# Patient Record
Sex: FEMALE | Race: WHITE
[De-identification: names, ages, dates, MRNs, and addresses within clinical notes are randomized per-mention and may not be internally consistent; named-entity substitution may affect disease eponyms.]

---

## 2019-10-02 ENCOUNTER — HOSPITAL ENCOUNTER (OUTPATIENT)
Dept: HOSPITAL 33 - SDC-PAIN | Age: 69
Discharge: HOME | End: 2019-10-02
Attending: PSYCHIATRY & NEUROLOGY
Payer: MEDICARE

## 2019-10-02 DIAGNOSIS — M79.7: ICD-10-CM

## 2019-10-02 DIAGNOSIS — M16.12: Primary | ICD-10-CM

## 2019-10-02 DIAGNOSIS — Z79.899: ICD-10-CM

## 2019-10-02 DIAGNOSIS — F32.9: ICD-10-CM

## 2019-10-02 DIAGNOSIS — M70.62: ICD-10-CM

## 2019-10-02 DIAGNOSIS — J44.9: ICD-10-CM

## 2019-10-02 DIAGNOSIS — I25.10: ICD-10-CM

## 2019-10-02 PROCEDURE — 77002 NEEDLE LOCALIZATION BY XRAY: CPT

## 2019-10-02 PROCEDURE — 72020 X-RAY EXAM OF SPINE 1 VIEW: CPT

## 2019-10-02 PROCEDURE — 20552 NJX 1/MLT TRIGGER POINT 1/2: CPT

## 2019-10-02 NOTE — XRAY
Indication: Left piriformis muscle injection.



Intraoperative fluoroscopy was provided for 13 seconds.  Single digital spot

image obtained prone demonstrates needle tip projecting over the expected left

piriformis muscle.  Small amount of contrast injected for needle tip

placement.  Correlate with intraoperative findings/report.

## 2019-10-23 ENCOUNTER — HOSPITAL ENCOUNTER (OUTPATIENT)
Dept: HOSPITAL 33 - SDC-PAIN | Age: 69
Discharge: HOME | End: 2019-10-23
Attending: PSYCHIATRY & NEUROLOGY
Payer: MEDICARE

## 2019-10-23 DIAGNOSIS — Z79.899: ICD-10-CM

## 2019-10-23 DIAGNOSIS — J44.9: ICD-10-CM

## 2019-10-23 DIAGNOSIS — M79.7: ICD-10-CM

## 2019-10-23 DIAGNOSIS — F32.9: ICD-10-CM

## 2019-10-23 DIAGNOSIS — M47.816: Primary | ICD-10-CM

## 2019-10-23 PROCEDURE — 72020 X-RAY EXAM OF SPINE 1 VIEW: CPT

## 2019-10-23 PROCEDURE — 64494 INJ PARAVERT F JNT L/S 2 LEV: CPT

## 2019-10-23 PROCEDURE — 64493 INJ PARAVERT F JNT L/S 1 LEV: CPT

## 2019-10-23 PROCEDURE — 77002 NEEDLE LOCALIZATION BY XRAY: CPT

## 2019-10-23 NOTE — XRAY
Indication: Bilateral L4-S1 MBB.



Intraoperative fluoroscopy was provided for 7 seconds.  Single digital spot

image submitted for interpretation demonstrates posterior needle tips

projecting over the expected course of the left and right L4-S1 nerve roots.

Correlate with intraoperative findings/report.

## 2019-12-18 ENCOUNTER — HOSPITAL ENCOUNTER (OUTPATIENT)
Dept: HOSPITAL 33 - SDC-PAIN | Age: 69
Discharge: HOME | End: 2019-12-18
Attending: PSYCHIATRY & NEUROLOGY
Payer: MEDICARE

## 2019-12-18 DIAGNOSIS — M79.7: ICD-10-CM

## 2019-12-18 DIAGNOSIS — Z79.899: ICD-10-CM

## 2019-12-18 DIAGNOSIS — I25.10: ICD-10-CM

## 2019-12-18 DIAGNOSIS — M47.816: Primary | ICD-10-CM

## 2019-12-18 DIAGNOSIS — F41.8: ICD-10-CM

## 2019-12-18 DIAGNOSIS — J44.9: ICD-10-CM

## 2019-12-18 PROCEDURE — 64493 INJ PARAVERT F JNT L/S 1 LEV: CPT

## 2019-12-18 PROCEDURE — 72020 X-RAY EXAM OF SPINE 1 VIEW: CPT

## 2019-12-18 PROCEDURE — 64494 INJ PARAVERT F JNT L/S 2 LEV: CPT

## 2019-12-18 PROCEDURE — 77002 NEEDLE LOCALIZATION BY XRAY: CPT

## 2019-12-18 NOTE — XRAY
Indication: Bilateral L4-S1 MBB.



Intraoperative fluoroscopy was provided for 8 seconds.  Single digital spot

image submitted for interpretation demonstrates posterior needle tips

projecting over the expected course of the left and right L4-S1 nerve roots.

Correlate with intraoperative findings/report.

## 2020-02-19 ENCOUNTER — HOSPITAL ENCOUNTER (OUTPATIENT)
Dept: HOSPITAL 33 - SDC-PAIN | Age: 70
Discharge: HOME | End: 2020-02-19
Attending: PSYCHIATRY & NEUROLOGY
Payer: MEDICARE

## 2020-02-19 DIAGNOSIS — J44.9: ICD-10-CM

## 2020-02-19 DIAGNOSIS — Z79.899: ICD-10-CM

## 2020-02-19 DIAGNOSIS — F41.8: ICD-10-CM

## 2020-02-19 DIAGNOSIS — M47.819: ICD-10-CM

## 2020-02-19 DIAGNOSIS — M79.7: ICD-10-CM

## 2020-02-19 DIAGNOSIS — M47.816: Primary | ICD-10-CM

## 2020-02-19 DIAGNOSIS — I25.10: ICD-10-CM

## 2020-02-19 PROCEDURE — 64635 DESTROY LUMB/SAC FACET JNT: CPT

## 2020-02-19 PROCEDURE — 72100 X-RAY EXAM L-S SPINE 2/3 VWS: CPT

## 2020-02-19 PROCEDURE — 77002 NEEDLE LOCALIZATION BY XRAY: CPT

## 2020-02-19 PROCEDURE — 64636 DESTROY L/S FACET JNT ADDL: CPT

## 2020-02-19 NOTE — XRAY
Indication: Left L4-S1 RFA.



Intraoperative fluoroscopy was provided for 29 seconds.  3 digital spot images

submitted for interpretation demonstrates posterior needle tips projecting

over the expected course of the left L4-S1 nerve root.  Correlate with

intraoperative findings/report.

## 2020-03-04 ENCOUNTER — HOSPITAL ENCOUNTER (OUTPATIENT)
Dept: HOSPITAL 33 - SDC-PAIN | Age: 70
Discharge: HOME | End: 2020-03-04
Attending: PSYCHIATRY & NEUROLOGY
Payer: MEDICARE

## 2020-03-04 DIAGNOSIS — Z79.899: ICD-10-CM

## 2020-03-04 DIAGNOSIS — I25.10: ICD-10-CM

## 2020-03-04 DIAGNOSIS — M79.7: ICD-10-CM

## 2020-03-04 DIAGNOSIS — M47.816: Primary | ICD-10-CM

## 2020-03-04 DIAGNOSIS — J44.9: ICD-10-CM

## 2020-03-04 PROCEDURE — 77002 NEEDLE LOCALIZATION BY XRAY: CPT

## 2020-03-04 PROCEDURE — 64636 DESTROY L/S FACET JNT ADDL: CPT

## 2020-03-04 PROCEDURE — 64635 DESTROY LUMB/SAC FACET JNT: CPT

## 2020-03-04 PROCEDURE — 72100 X-RAY EXAM L-S SPINE 2/3 VWS: CPT

## 2020-03-04 NOTE — XRAY
Indication: Right L5-S1 RFA.



Intraoperative fluoroscopy was provided for 26 seconds.  3 digital spot images

submitted for interpretation demonstrates posterior needle tips projecting

over the expected course of the right L4-S1 nerve roots.  Correlate with

intraoperative findings/report.

## 2020-07-01 ENCOUNTER — HOSPITAL ENCOUNTER (OUTPATIENT)
Dept: HOSPITAL 33 - SDC-PAIN | Age: 70
Discharge: HOME | End: 2020-07-01
Attending: PSYCHIATRY & NEUROLOGY
Payer: MEDICARE

## 2020-07-01 DIAGNOSIS — J44.9: ICD-10-CM

## 2020-07-01 DIAGNOSIS — M16.11: Primary | ICD-10-CM

## 2020-07-01 DIAGNOSIS — Z79.899: ICD-10-CM

## 2020-07-01 DIAGNOSIS — M79.7: ICD-10-CM

## 2020-07-01 DIAGNOSIS — F41.8: ICD-10-CM

## 2020-07-01 DIAGNOSIS — I25.10: ICD-10-CM

## 2020-07-01 PROCEDURE — 73501 X-RAY EXAM HIP UNI 1 VIEW: CPT

## 2020-07-01 PROCEDURE — 77002 NEEDLE LOCALIZATION BY XRAY: CPT

## 2020-07-01 PROCEDURE — 20610 DRAIN/INJ JOINT/BURSA W/O US: CPT

## 2020-07-01 NOTE — XRAY
Indication: Right hip injection.



Intraoperative fluoroscopy was provided for 15 seconds.  Single digital spot

image submitted for interpretation demonstrates needle tip just lateral to the

right femur neck.  Small amount of contrast injected for needle tip placement.

 Correlate with intraoperative findings/report.

## 2020-10-14 ENCOUNTER — HOSPITAL ENCOUNTER (OUTPATIENT)
Dept: HOSPITAL 33 - SDC-PAIN | Age: 70
Discharge: HOME | End: 2020-10-14
Attending: PSYCHIATRY & NEUROLOGY
Payer: MEDICARE

## 2020-10-14 DIAGNOSIS — M79.18: ICD-10-CM

## 2020-10-14 DIAGNOSIS — I25.10: ICD-10-CM

## 2020-10-14 DIAGNOSIS — J44.9: ICD-10-CM

## 2020-10-14 DIAGNOSIS — Z79.899: ICD-10-CM

## 2020-10-14 DIAGNOSIS — M46.1: Primary | ICD-10-CM

## 2020-10-14 PROCEDURE — 77002 NEEDLE LOCALIZATION BY XRAY: CPT

## 2020-10-14 PROCEDURE — 72202 X-RAY EXAM SI JOINTS 3/> VWS: CPT

## 2020-10-14 PROCEDURE — 20552 NJX 1/MLT TRIGGER POINT 1/2: CPT

## 2020-10-14 PROCEDURE — G0260 INJ FOR SACROILIAC JT ANESTH: HCPCS

## 2020-10-14 PROCEDURE — 27096 INJECT SACROILIAC JOINT: CPT

## 2020-10-14 PROCEDURE — 99100 ANES PT EXTEME AGE<1 YR&>70: CPT

## 2020-10-14 NOTE — XRAY
Indication: Right SI joint and piriformis muscle injection.



Intraoperative fluoroscopy was provided for 18 seconds.  3 digital spot images

obtained prone submitted for interpretation demonstrates posterior needle tip

projecting over the inferior right SI joint.  A second posterior needle tip

projects over the expected right piriformis muscle with small amount of

contrast injected for needle tip placement.  Correlate with intraoperative

findings/report.

## 2024-09-04 ENCOUNTER — HOSPITAL ENCOUNTER (OUTPATIENT)
Dept: HOSPITAL 33 - SDC-PAIN | Age: 74
Discharge: HOME | End: 2024-09-04
Attending: PSYCHIATRY & NEUROLOGY
Payer: MEDICARE

## 2024-09-04 DIAGNOSIS — M79.18: ICD-10-CM

## 2024-09-04 DIAGNOSIS — M54.16: Primary | ICD-10-CM

## 2024-09-04 PROCEDURE — 77003 FLUOROGUIDE FOR SPINE INJECT: CPT

## 2024-09-04 PROCEDURE — 72170 X-RAY EXAM OF PELVIS: CPT

## 2024-09-04 PROCEDURE — 64484 NJX AA&/STRD TFRM EPI L/S EA: CPT

## 2024-09-04 PROCEDURE — 77002 NEEDLE LOCALIZATION BY XRAY: CPT

## 2024-09-04 PROCEDURE — 72100 X-RAY EXAM L-S SPINE 2/3 VWS: CPT

## 2024-09-04 PROCEDURE — 99100 ANES PT EXTEME AGE<1 YR&>70: CPT

## 2024-09-04 PROCEDURE — 64483 NJX AA&/STRD TFRM EPI L/S 1: CPT

## 2024-09-04 PROCEDURE — 20553 NJX 1/MLT TRIGGER POINTS 3/>: CPT

## 2024-09-04 NOTE — XRAY
Indication: Left piriformis injection.



Intraoperative fluoroscopy provided for 14 seconds.  Single digital spot image

submitted for interpretation demonstrates posterior needle tip projecting over

left piriformis.  Small amount of contrast injected for needle tip placement.

Correlate with intraoperative findings/report.

## 2024-10-23 ENCOUNTER — HOSPITAL ENCOUNTER (OUTPATIENT)
Dept: HOSPITAL 33 - SDC-PAIN | Age: 74
Discharge: HOME | End: 2024-10-23
Attending: PSYCHIATRY & NEUROLOGY
Payer: MEDICARE

## 2024-10-23 DIAGNOSIS — M54.16: Primary | ICD-10-CM

## 2024-10-23 DIAGNOSIS — M79.18: ICD-10-CM

## 2024-10-23 PROCEDURE — 64483 NJX AA&/STRD TFRM EPI L/S 1: CPT

## 2024-10-23 PROCEDURE — 77003 FLUOROGUIDE FOR SPINE INJECT: CPT

## 2024-10-23 PROCEDURE — 20552 NJX 1/MLT TRIGGER POINT 1/2: CPT

## 2024-10-23 PROCEDURE — 72100 X-RAY EXAM L-S SPINE 2/3 VWS: CPT

## 2024-10-23 PROCEDURE — 77002 NEEDLE LOCALIZATION BY XRAY: CPT

## 2024-10-23 PROCEDURE — 72170 X-RAY EXAM OF PELVIS: CPT

## 2024-10-23 PROCEDURE — 64484 NJX AA&/STRD TFRM EPI L/S EA: CPT

## 2024-10-23 NOTE — XRAY
Indication: Right piriformis injection JOE.



Intraoperative fluoroscopy provided for 7 seconds.  Single digital spot image

submitted for interpretation demonstrates posterior needle tip projecting over

the right piriformis.  Small amount of contrast injected for needle tip

placement.  Correlate with intraoperative findings/report.  Incidental right

SI joint fusion hardware

## 2024-10-23 NOTE — XRAY
Indication: Right L4-S1 transforaminal JOE.



Intraoperative fluoroscopy provided for 28 seconds.  4 digital spot image

submitted for interpretation demonstrates posterior needle tips projecting

over the expected right L4 and L5 nerve roots.  Small amount of contrast

injected for needle tip placement.  Correlate with intraoperative

findings/report.  Incidental right SI joint fusion hardware

## 2025-04-30 ENCOUNTER — HOSPITAL ENCOUNTER (INPATIENT)
Dept: HOSPITAL 33 - ED | Age: 75
LOS: 5 days | Discharge: HOME | DRG: 871 | End: 2025-05-05
Attending: INTERNAL MEDICINE | Admitting: INTERNAL MEDICINE
Payer: MEDICARE

## 2025-04-30 DIAGNOSIS — Z79.899: ICD-10-CM

## 2025-04-30 DIAGNOSIS — E87.1: ICD-10-CM

## 2025-04-30 DIAGNOSIS — E78.5: ICD-10-CM

## 2025-04-30 DIAGNOSIS — F10.10: ICD-10-CM

## 2025-04-30 DIAGNOSIS — J96.01: ICD-10-CM

## 2025-04-30 DIAGNOSIS — N17.9: ICD-10-CM

## 2025-04-30 DIAGNOSIS — I95.9: ICD-10-CM

## 2025-04-30 DIAGNOSIS — Z72.0: ICD-10-CM

## 2025-04-30 DIAGNOSIS — J18.9: ICD-10-CM

## 2025-04-30 DIAGNOSIS — I10: ICD-10-CM

## 2025-04-30 DIAGNOSIS — D72.829: ICD-10-CM

## 2025-04-30 DIAGNOSIS — R74.01: ICD-10-CM

## 2025-04-30 DIAGNOSIS — J44.1: ICD-10-CM

## 2025-04-30 DIAGNOSIS — E66.9: ICD-10-CM

## 2025-04-30 DIAGNOSIS — Z79.01: ICD-10-CM

## 2025-04-30 DIAGNOSIS — R60.0: ICD-10-CM

## 2025-04-30 DIAGNOSIS — Z86.718: ICD-10-CM

## 2025-04-30 DIAGNOSIS — I25.10: ICD-10-CM

## 2025-04-30 DIAGNOSIS — I48.92: ICD-10-CM

## 2025-04-30 DIAGNOSIS — A41.9: Primary | ICD-10-CM

## 2025-04-30 LAB
ANION GAP SERPL CALC-SCNC: 18.1 MEQ/L (ref 5–15)
ANION GAP SERPL CALC-SCNC: 22.2 MEQ/L (ref 5–15)
ARTERIAL PATENCY WRIST A: (no result)
BASE EXCESS BLDA CALC-SCNC: -5.7 MMOL/L (ref -2–2)
BILIRUB BLD-MCNC: 2.2 MG/DL (ref 0.2–1.3)
CALCIUM SPEC-MCNC: 7.4 MG/DL (ref 8.4–10.2)
CALCIUM SPEC-MCNC: 8.2 MG/DL (ref 8.4–10.2)
CELLS COUNTED: 100
COHGB BLD-MCNC: 3.2 % THGB (ref 0–6.9)
CREAT SERPL-MCNC: 1.09 MG/DL (ref 0.52–1.04)
CREAT SERPL-MCNC: 1.1 MG/DL (ref 0.52–1.04)
FLUAV AG NPH QL IA: NEGATIVE
FLUBV AG NPH QL IA: NEGATIVE
GAS PNL BLDA: 13.6
GFR SERPLBLD BASED ON 1.73 SQ M-ARVRAT: 52.7 ML/MIN
GFR SERPLBLD BASED ON 1.73 SQ M-ARVRAT: 53.3 ML/MIN
HCO3 BLDA-SCNC: 18.3 MMOL/L (ref 22–28)
HCT VFR BLD AUTO: 38.1 % (ref 34.1–44.9)
HGB BLD-MCNC: 13.4 G/DL (ref 11.2–15.7)
INHALED O2 CONCENTRATION: 36 %
MAGNESIUM SERPL-MCNC: 1.8 MG/DL (ref 1.6–2.3)
MANUAL DIF COMMENT BLD-IMP: NORMAL
MCH RBC QN AUTO: 32.8 PG (ref 25.6–32.2)
MCHC RBC AUTO-ENTMCNC: 35.2 G/DL (ref 32.2–35.5)
METHGB MFR BLDA: 0.8 % (ref 1.4–1.5)
NEUTS BAND # BLD MANUAL: 10 % (ref 0–2)
O2 A-A PPRESDIFF RESPIRATORY: 145 MM[HG]
PCO2 BLDA: 31 MMHG (ref 35–45)
PLATELET # BLD AUTO: 161 X10^3/UL (ref 182–369)
PO2 BLDA: 73 MMHG (ref 75–100)
POTASSIUM SERPLBLD-SCNC: 3.8 MMOL/L (ref 3.5–5.1)
POTASSIUM SERPLBLD-SCNC: 4.3 MMOL/L (ref 3.5–5.1)
PROT SERPL-MCNC: 5.3 G/DL (ref 6.3–8.2)
RBC # BLD AUTO: 4.08 X10^6/UL (ref 3.93–5.22)
RBC # URNS HPF: (no result) /HPF (ref 0–5)
RSV AG SPEC QL IA: NEGATIVE
SAO2 % BLDA FROM PO2: 0.33 %
SAO2 % BLDA: 92.5 G/DF (ref 94–100)
SAO2 % BLDA: 96.4 % (ref 95–100)
SARS-COV-2 AG RESP QL IA.RAPID: NEGATIVE
SPECIMEN SOURCE: (no result)
TOXIC GRANULES BLD QL SMEAR: (no result)
WBC # BLD AUTO: 19.1 X10^3/UL (ref 3.98–10.04)
WBC URNS QL MICRO: (no result) /HPF (ref 0–5)

## 2025-04-30 PROCEDURE — 83880 ASSAY OF NATRIURETIC PEPTIDE: CPT

## 2025-04-30 PROCEDURE — 82947 ASSAY GLUCOSE BLOOD QUANT: CPT

## 2025-04-30 PROCEDURE — 0241U: CPT

## 2025-04-30 PROCEDURE — 80053 COMPREHEN METABOLIC PANEL: CPT

## 2025-04-30 PROCEDURE — 93306 TTE W/DOPPLER COMPLETE: CPT

## 2025-04-30 PROCEDURE — 81001 URINALYSIS AUTO W/SCOPE: CPT

## 2025-04-30 PROCEDURE — 36415 COLL VENOUS BLD VENIPUNCTURE: CPT

## 2025-04-30 PROCEDURE — 99291 CRITICAL CARE FIRST HOUR: CPT

## 2025-04-30 PROCEDURE — 84132 ASSAY OF SERUM POTASSIUM: CPT

## 2025-04-30 PROCEDURE — 82803 BLOOD GASES ANY COMBINATION: CPT

## 2025-04-30 PROCEDURE — 36600 WITHDRAWAL OF ARTERIAL BLOOD: CPT

## 2025-04-30 PROCEDURE — 94640 AIRWAY INHALATION TREATMENT: CPT

## 2025-04-30 PROCEDURE — 71260 CT THORAX DX C+: CPT

## 2025-04-30 PROCEDURE — 87070 CULTURE OTHR SPECIMN AEROBIC: CPT

## 2025-04-30 PROCEDURE — 83735 ASSAY OF MAGNESIUM: CPT

## 2025-04-30 PROCEDURE — 93005 ELECTROCARDIOGRAM TRACING: CPT

## 2025-04-30 PROCEDURE — 85025 COMPLETE CBC W/AUTO DIFF WBC: CPT

## 2025-04-30 PROCEDURE — 94760 N-INVAS EAR/PLS OXIMETRY 1: CPT

## 2025-04-30 PROCEDURE — 94668 MNPJ CHEST WALL SBSQ: CPT

## 2025-04-30 PROCEDURE — 80048 BASIC METABOLIC PNL TOTAL CA: CPT

## 2025-04-30 PROCEDURE — 94667 MNPJ CHEST WALL 1ST: CPT

## 2025-04-30 PROCEDURE — 83036 HEMOGLOBIN GLYCOSYLATED A1C: CPT

## 2025-04-30 PROCEDURE — 84484 ASSAY OF TROPONIN QUANT: CPT

## 2025-04-30 PROCEDURE — 83605 ASSAY OF LACTIC ACID: CPT

## 2025-04-30 PROCEDURE — 96374 THER/PROPH/DIAG INJ IV PUSH: CPT

## 2025-04-30 PROCEDURE — 82375 ASSAY CARBOXYHB QUANT: CPT

## 2025-04-30 PROCEDURE — 93041 RHYTHM ECG TRACING: CPT

## 2025-04-30 PROCEDURE — 87040 BLOOD CULTURE FOR BACTERIA: CPT

## 2025-04-30 PROCEDURE — 99284 EMERGENCY DEPT VISIT MOD MDM: CPT

## 2025-04-30 PROCEDURE — 71045 X-RAY EXAM CHEST 1 VIEW: CPT

## 2025-04-30 RX ADMIN — SODIUM BICARBONATE TAB 650 MG SCH MG: 650 TAB at 21:55

## 2025-04-30 RX ADMIN — NICOTINE SCH MG: 21 PATCH, EXTENDED RELEASE TRANSDERMAL at 15:42

## 2025-04-30 RX ADMIN — ENOXAPARIN SODIUM SCH MG: 100 INJECTION SUBCUTANEOUS at 15:46

## 2025-04-30 RX ADMIN — WATER SCH MG: 1 INJECTION INTRAMUSCULAR; INTRAVENOUS; SUBCUTANEOUS at 21:56

## 2025-04-30 RX ADMIN — CEFTRIAXONE SODIUM ONE MLS/HR: 2 INJECTION, POWDER, FOR SOLUTION INTRAMUSCULAR; INTRAVENOUS at 13:07

## 2025-04-30 RX ADMIN — IPRATROPIUM BROMIDE AND ALBUTEROL SULFATE SCH ML: .5; 3 SOLUTION RESPIRATORY (INHALATION) at 14:52

## 2025-04-30 RX ADMIN — AMITRIPTYLINE HYDROCHLORIDE SCH MG: 25 TABLET, FILM COATED ORAL at 21:55

## 2025-04-30 RX ADMIN — BENZONATATE PRN MG: 100 CAPSULE ORAL at 21:56

## 2025-04-30 RX ADMIN — PANTOPRAZOLE SODIUM SCH MG: 40 INJECTION, POWDER, FOR SOLUTION INTRAVENOUS at 15:44

## 2025-04-30 RX ADMIN — IPRATROPIUM BROMIDE AND ALBUTEROL SULFATE ONE ML: .5; 3 SOLUTION RESPIRATORY (INHALATION) at 12:04

## 2025-04-30 RX ADMIN — SODIUM CHLORIDE STA MLS/HR: 9 INJECTION, SOLUTION INTRAVENOUS at 14:27

## 2025-04-30 RX ADMIN — WATER ONE MG: 1 INJECTION INTRAMUSCULAR; INTRAVENOUS; SUBCUTANEOUS at 12:08

## 2025-04-30 RX ADMIN — DEXTROMETHORPHAN HBR, GUAIFENESIN PRN ML: 20; 200 SYRUP ORAL at 15:53

## 2025-05-01 LAB
ALBUMIN SERPL-MCNC: 2.6 G/DL (ref 3.5–5)
ANION GAP SERPL CALC-SCNC: 15.2 MEQ/L (ref 5–15)
CALCIUM SPEC-MCNC: 7.8 MG/DL (ref 8.4–10.2)
CELLS COUNTED: 100
CREAT SERPL-MCNC: 1.07 MG/DL (ref 0.52–1.04)
GFR SERPLBLD BASED ON 1.73 SQ M-ARVRAT: 54.5 ML/MIN
HCT VFR BLD AUTO: 31.8 % (ref 34.1–44.9)
HGB BLD-MCNC: 11.3 G/DL (ref 11.2–15.7)
MANUAL DIF COMMENT BLD-IMP: NORMAL
MCH RBC QN AUTO: 33.1 PG (ref 25.6–32.2)
MCHC RBC AUTO-ENTMCNC: 35.5 G/DL (ref 32.2–35.5)
NEUTS BAND # BLD MANUAL: 4 % (ref 0–2)
PLATELET # BLD AUTO: 144 X10^3/UL (ref 182–369)
POTASSIUM SERPLBLD-SCNC: 3.3 MMOL/L (ref 3.5–5.1)
RBC # BLD AUTO: 3.41 X10^6/UL (ref 3.93–5.22)
TOXIC GRANULES BLD QL SMEAR: (no result)
WBC # BLD AUTO: 12.9 X10^3/UL (ref 3.98–10.04)

## 2025-05-01 RX ADMIN — GUAIFENESIN SCH MG: 600 TABLET, EXTENDED RELEASE ORAL at 21:54

## 2025-05-01 RX ADMIN — METOPROLOL TARTRATE ONE: 1 INJECTION, SOLUTION INTRAVENOUS at 16:05

## 2025-05-01 RX ADMIN — CLOPIDOGREL BISULFATE SCH MG: 75 TABLET ORAL at 08:21

## 2025-05-01 RX ADMIN — METOPROLOL TARTRATE ONE: 25 TABLET, FILM COATED ORAL at 17:52

## 2025-05-01 RX ADMIN — CEFTRIAXONE SODIUM SCH MLS/HR: 1 INJECTION, POWDER, FOR SOLUTION INTRAMUSCULAR; INTRAVENOUS at 08:23

## 2025-05-01 RX ADMIN — POTASSIUM CHLORIDE SCH MEQ: 10 TABLET, EXTENDED RELEASE ORAL at 08:21

## 2025-05-01 RX ADMIN — WATER SCH MG: 1 INJECTION INTRAMUSCULAR; INTRAVENOUS; SUBCUTANEOUS at 21:55

## 2025-05-01 RX ADMIN — ASPIRIN SCH MG: 81 TABLET, COATED ORAL at 08:20

## 2025-05-01 RX ADMIN — SODIUM CHLORIDE SCH MLS/HR: 9 INJECTION, SOLUTION INTRAVENOUS at 09:59

## 2025-05-01 RX ADMIN — METOPROLOL TARTRATE SCH MG: 50 TABLET, FILM COATED ORAL at 08:20

## 2025-05-01 RX ADMIN — Medication SCH UNITS: at 08:21

## 2025-05-01 RX ADMIN — METOPROLOL TARTRATE ONE MG: 25 TABLET, FILM COATED ORAL at 21:54

## 2025-05-02 LAB
ALBUMIN SERPL-MCNC: 2.5 G/DL (ref 3.5–5)
ANION GAP SERPL CALC-SCNC: 13.7 MEQ/L (ref 5–15)
BASOPHILS # BLD AUTO: 0.06 X10^3/UL (ref 0.01–0.08)
BASOPHILS NFR BLD AUTO: 0.4 % (ref 0.1–1.2)
BILIRUB BLD-MCNC: 0.5 MG/DL (ref 0.2–1.3)
CALCIUM SPEC-MCNC: 8.6 MG/DL (ref 8.4–10.2)
CREAT SERPL-MCNC: 0.91 MG/DL (ref 0.52–1.04)
EOSINOPHIL # BLD AUTO: 0.03 X10^3/UL (ref 0.04–0.36)
GFR SERPLBLD BASED ON 1.73 SQ M-ARVRAT: 66.2 ML/MIN
HCT VFR BLD AUTO: 33.7 % (ref 34.1–44.9)
HGB BLD-MCNC: 11.4 G/DL (ref 11.2–15.7)
IMM GRANULOCYTES # BLD: 0.51 X10^3U/L (ref 0–0.03)
IMM GRANULOCYTES NFR BLD: 3.5 % (ref 0–0.43)
LYMPHOCYTES # SPEC AUTO: 0.85 X10^3/UL (ref 1.18–3.74)
MCH RBC QN AUTO: 31.7 PG (ref 25.6–32.2)
MCHC RBC AUTO-ENTMCNC: 33.8 G/DL (ref 32.2–35.5)
MONOCYTES # BLD AUTO: 1.09 X10^3/UL (ref 0.24–0.86)
NRBC # BLD AUTO: 0.03 X10^3U/L (ref 0–0.01)
NRBC BLD AUTO-RTO: 0.2 % (ref 0–0.2)
PLATELET # BLD AUTO: 136 X10^3/UL (ref 182–369)
POTASSIUM SERPLBLD-SCNC: 4.7 MMOL/L (ref 3.5–5.1)
PROT SERPL-MCNC: 5.1 G/DL (ref 6.3–8.2)
WBC # BLD AUTO: 14.5 X10^3/UL (ref 3.98–10.04)

## 2025-05-02 RX ADMIN — ISODIUM CHLORIDE PRN ML: 0.03 SOLUTION RESPIRATORY (INHALATION) at 07:03

## 2025-05-02 RX ADMIN — LEVALBUTEROL HYDROCHLORIDE PRN MG: 1.25 SOLUTION, CONCENTRATE RESPIRATORY (INHALATION) at 07:03

## 2025-05-03 LAB
ALBUMIN SERPL-MCNC: 2.6 G/DL (ref 3.5–5)
ANION GAP SERPL CALC-SCNC: 13.6 MEQ/L (ref 5–15)
BILIRUB BLD-MCNC: 0.6 MG/DL (ref 0.2–1.3)
CALCIUM SPEC-MCNC: 8.4 MG/DL (ref 8.4–10.2)
CELLS COUNTED: 100
CREAT SERPL-MCNC: 0.86 MG/DL (ref 0.52–1.04)
GFR SERPLBLD BASED ON 1.73 SQ M-ARVRAT: 70.9 ML/MIN
HCT VFR BLD AUTO: 35.8 % (ref 34.1–44.9)
MANUAL DIF COMMENT BLD-IMP: NORMAL
MCH RBC QN AUTO: 32.3 PG (ref 25.6–32.2)
MCHC RBC AUTO-ENTMCNC: 33.5 G/DL (ref 32.2–35.5)
NEUTS BAND # BLD MANUAL: 4 % (ref 0–2)
PLATELET # BLD AUTO: 138 X10^3/UL (ref 182–369)
POTASSIUM SERPLBLD-SCNC: 4.8 MMOL/L (ref 3.5–5.1)
PROT SERPL-MCNC: 5.1 G/DL (ref 6.3–8.2)
RBC # BLD AUTO: 3.71 X10^6/UL (ref 3.93–5.22)
TOXIC GRANULES BLD QL SMEAR: (no result)
WBC # BLD AUTO: 15.1 X10^3/UL (ref 3.98–10.04)

## 2025-05-03 RX ADMIN — DILTIAZEM HYDROCHLORIDE SCH MG: 30 TABLET, FILM COATED ORAL at 22:23

## 2025-05-03 RX ADMIN — APIXABAN SCH MG: 2.5 TABLET, FILM COATED ORAL at 21:13

## 2025-05-03 RX ADMIN — DILTIAZEM HYDROCHLORIDE SCH MG: 30 TABLET, FILM COATED ORAL at 23:58

## 2025-05-03 RX ADMIN — CEFTRIAXONE SODIUM SCH MLS/HR: 1 INJECTION, POWDER, FOR SOLUTION INTRAMUSCULAR; INTRAVENOUS at 09:44

## 2025-05-04 LAB
ALBUMIN SERPL-MCNC: 2.7 G/DL (ref 3.5–5)
ANION GAP SERPL CALC-SCNC: 13.9 MEQ/L (ref 5–15)
BILIRUB BLD-MCNC: 0.6 MG/DL (ref 0.2–1.3)
CALCIUM SPEC-MCNC: 8.5 MG/DL (ref 8.4–10.2)
CELLS COUNTED: 100
CREAT SERPL-MCNC: 0.81 MG/DL (ref 0.52–1.04)
GFR SERPLBLD BASED ON 1.73 SQ M-ARVRAT: 76.1 ML/MIN
HGB BLD-MCNC: 12.6 G/DL (ref 11.2–15.7)
MANUAL DIF COMMENT BLD-IMP: NORMAL
MCH RBC QN AUTO: 32.3 PG (ref 25.6–32.2)
MCHC RBC AUTO-ENTMCNC: 33.2 G/DL (ref 32.2–35.5)
NEUTS BAND # BLD MANUAL: 4 % (ref 0–2)
PLATELET # BLD AUTO: 150 X10^3/UL (ref 182–369)
POTASSIUM SERPLBLD-SCNC: 4.4 MMOL/L (ref 3.5–5.1)
PROT SERPL-MCNC: 5.2 G/DL (ref 6.3–8.2)
TOXIC GRANULES BLD QL SMEAR: (no result)
WBC # BLD AUTO: 15.9 X10^3/UL (ref 3.98–10.04)

## 2025-05-04 RX ADMIN — FUROSEMIDE SCH MG: 10 INJECTION, SOLUTION INTRAMUSCULAR; INTRAVENOUS at 12:04

## 2025-05-04 RX ADMIN — FOLIC ACID SCH MG: 1 TABLET ORAL at 09:47

## 2025-05-04 RX ADMIN — Medication SCH MG: at 09:47

## 2025-05-04 RX ADMIN — THERA TABS SCH TAB: TAB at 09:46

## 2025-05-05 VITALS — OXYGEN SATURATION: 92 %

## 2025-05-05 VITALS — DIASTOLIC BLOOD PRESSURE: 58 MMHG | RESPIRATION RATE: 22 BRPM | SYSTOLIC BLOOD PRESSURE: 106 MMHG | HEART RATE: 90 BPM

## 2025-05-05 LAB
ALBUMIN SERPL-MCNC: 2.5 G/DL (ref 3.5–5)
ANION GAP SERPL CALC-SCNC: 12.8 MEQ/L (ref 5–15)
BILIRUB BLD-MCNC: 0.6 MG/DL (ref 0.2–1.3)
CALCIUM SPEC-MCNC: 8.4 MG/DL (ref 8.4–10.2)
CELLS COUNTED: 100
CREAT SERPL-MCNC: 0.68 MG/DL (ref 0.52–1.04)
GFR SERPLBLD BASED ON 1.73 SQ M-ARVRAT: 91.3 ML/MIN
HCT VFR BLD AUTO: 36.2 % (ref 34.1–44.9)
MCH RBC QN AUTO: 32.1 PG (ref 25.6–32.2)
MCHC RBC AUTO-ENTMCNC: 33.1 G/DL (ref 32.2–35.5)
NEUTS BAND # BLD MANUAL: 3 % (ref 0–2)
PLATELET # BLD AUTO: 148 X10^3/UL (ref 182–369)
POTASSIUM SERPLBLD-SCNC: 4.2 MMOL/L (ref 3.5–5.1)
PROT SERPL-MCNC: 4.8 G/DL (ref 6.3–8.2)
RBC # BLD AUTO: 3.74 X10^6/UL (ref 3.93–5.22)
TOXIC GRANULES BLD QL SMEAR: (no result)
WBC # BLD AUTO: 16.8 X10^3/UL (ref 3.98–10.04)

## 2025-05-05 RX ADMIN — PREDNISONE SCH MG: 20 TABLET ORAL at 09:40
